# Patient Record
Sex: FEMALE | URBAN - METROPOLITAN AREA
[De-identification: names, ages, dates, MRNs, and addresses within clinical notes are randomized per-mention and may not be internally consistent; named-entity substitution may affect disease eponyms.]

---

## 2020-02-19 ENCOUNTER — TELEPHONE (OUTPATIENT)
Dept: NEUROLOGY | Facility: CLINIC | Age: 50
End: 2020-02-19

## 2020-02-27 ENCOUNTER — TELEPHONE (OUTPATIENT)
Dept: NEUROLOGY | Facility: CLINIC | Age: 50
End: 2020-02-27

## 2021-10-07 PROBLEM — Z00.00 ENCOUNTER FOR PREVENTIVE HEALTH EXAMINATION: Status: ACTIVE | Noted: 2021-10-07

## 2021-10-08 ENCOUNTER — APPOINTMENT (OUTPATIENT)
Dept: ORTHOPEDIC SURGERY | Facility: CLINIC | Age: 51
End: 2021-10-08
Payer: COMMERCIAL

## 2021-10-08 PROCEDURE — 99213 OFFICE O/P EST LOW 20 MIN: CPT | Mod: 95

## 2021-10-12 NOTE — HISTORY OF PRESENT ILLNESS
[de-identified] : HISTORY/CC: Patient is here in follow-up. Patient is approximately 10 months  status post revision thoracolumbar spinal fusion and 11 months s/p revision acdf C6-7.  \par Complains of chronic bilateral foot tingling.  Seeing neuro and has EMG planned.  She has no significant low back pain.  Also c/o R middle finger numbness.  Had a CT done of cervical spine. \par \par  \par \par \par \par \par  \par \par \par  \par \par \par  \par \par \par \par

## 2021-10-12 NOTE — ASSESSMENT
[FreeTextEntry1] : Reviewed CT C spine revealing solid fusion.  \par She will f/u with neurology for peripheral nerve complaints.

## 2021-12-13 ENCOUNTER — NON-APPOINTMENT (OUTPATIENT)
Age: 51
End: 2021-12-13

## 2023-02-17 ENCOUNTER — APPOINTMENT (OUTPATIENT)
Dept: ORTHOPEDIC SURGERY | Facility: CLINIC | Age: 53
End: 2023-02-17
Payer: COMMERCIAL

## 2023-02-17 DIAGNOSIS — M25.552 PAIN IN LEFT HIP: ICD-10-CM

## 2023-02-17 DIAGNOSIS — Z98.1 ARTHRODESIS STATUS: ICD-10-CM

## 2023-02-17 DIAGNOSIS — M51.24 OTHER INTERVERTEBRAL DISC DISPLACEMENT, THORACIC REGION: ICD-10-CM

## 2023-02-17 PROCEDURE — 99214 OFFICE O/P EST MOD 30 MIN: CPT | Mod: 95

## 2023-02-21 NOTE — REASON FOR VISIT
[Home] : at home, [unfilled] , at the time of the visit. [Other Location: e.g. Home (Enter Location, City,State)___] : at [unfilled] [Patient] : the patient [Self] : self [Follow-Up Visit] : a follow-up visit for [Neck Pain] : neck pain [Other: ____] : [unfilled]

## 2023-02-22 ENCOUNTER — TRANSCRIPTION ENCOUNTER (OUTPATIENT)
Age: 53
End: 2023-02-22

## 2023-03-08 PROBLEM — M51.24 HNP (HERNIATED NUCLEUS PULPOSUS), THORACIC: Status: ACTIVE | Noted: 2023-03-08

## 2023-03-08 PROBLEM — Z98.1 S/P SPINAL FUSION: Status: ACTIVE | Noted: 2021-10-12

## 2023-03-21 NOTE — DISCUSSION/SUMMARY
[de-identified] : A lengthy discussion was had with the patient regarding her condition.  Rx MRI L hip- can review via telehealth.  Neuro consult.   \par The patient's questions were sought and answered satisfactorily.\par \par \par \par MERLYN, Arlyn Kitchen NP am acting as a scribe for Dr. Frank Schwab.\par \par

## 2023-03-21 NOTE — HISTORY OF PRESENT ILLNESS
[de-identified] : Patient is approximately 3 years status post spinal fusion.  She complains of right mid and mid back pain that radiates into the ribs.  It is sharp and muscular.  She also has left groin pain with lifting her leg and left leg numbness for a year with the left leg being weaker.  States it is painful to walk, she has to grab onto things.  She has done physical therapy she exercises and has tried pain medication. Had steroid inj T10-T11 tfesi on right- no relief.

## 2023-03-21 NOTE — PHYSICAL EXAM
[de-identified] : CT myelogram lumbar, MRI thoracic, lumbar spine and L-spine x-rays dated 8/30/2022 reviewed by Dr. Schwab.  HNP T10-11 with spinal stenosis and kyphosis

## 2023-05-16 ENCOUNTER — APPOINTMENT (OUTPATIENT)
Dept: ORTHOPEDIC SURGERY | Facility: HOSPITAL | Age: 53
End: 2023-05-16
Payer: COMMERCIAL

## 2023-05-16 DIAGNOSIS — M70.62 TROCHANTERIC BURSITIS, LEFT HIP: ICD-10-CM

## 2023-05-16 PROCEDURE — 99213 OFFICE O/P EST LOW 20 MIN: CPT | Mod: 95

## 2023-05-25 PROBLEM — M70.62 GREATER TROCHANTERIC BURSITIS OF LEFT HIP: Status: ACTIVE | Noted: 2023-05-25

## 2023-06-15 NOTE — REASON FOR VISIT
[Home] : at home, [unfilled] , at the time of the visit. [Patient] : the patient [Follow-Up Visit] : a follow-up visit for [Medical Office: ([facility])___] : at the medical office located in

## 2023-06-27 NOTE — DISCUSSION/SUMMARY
[de-identified] : A lengthy discussion was held with the patient regarding her condition.  We discussed nonoperative treatment strategies including physical therapy, pharmacologic management and steroid injections.  We discussed surgical options (revision extension thoracic spinal fusion)  and the attendant benefits, alternatives, and risks, including but not limited to infection, bleeding, persistent pain, persistent neurologic deficit, neurologic injury, adjacent segment degeneration, and the need for future surgery.  The patient's questions were sought and answered satisfactorily.\par \par \par Arlyn ZULETA NP am acting as a scribe for Dr. Frank Schwab.\par \par

## 2023-06-27 NOTE — HISTORY OF PRESENT ILLNESS
[de-identified] : Pt presents to review MRI L hip report and neuro eval.  Complains of R rib/flank pain with twisting while driving.  Has mid back pain at top of incision.   L thigh is numb.  neuro said EMG not helpful.  Been in PT, acupuncture.  Facet inj T10-T11 denied by ins.  Taking morphine sulfate and norco prn. \par \par 2/17/23 telehealth: Patient is approximately 3 years status post spinal fusion. She complains of right mid and mid back pain that radiates into the ribs. It is sharp and muscular. She also has left groin pain with lifting her leg and left leg numbness for a year with the left leg being weaker. States it is painful to walk, she has to grab onto things. She has done physical therapy she exercises and has tried pain medication. Had steroid inj T10-T11 tfesi on right- no relief. \par  \par

## 2023-06-27 NOTE — PHYSICAL EXAM
[de-identified] : CT myelogram lumbar, MRI thoracic, lumbar spine and L-spine x-rays dated 8/30/2022 reviewed by Dr. Schwab. HNP T10-11 with spinal stenosis and kyphosis, advanced junctional DDD.\par \par  \par

## 2023-11-20 ENCOUNTER — APPOINTMENT (OUTPATIENT)
Dept: ORTHOPEDIC SURGERY | Facility: HOSPITAL | Age: 53
End: 2023-11-20

## 2023-11-20 ENCOUNTER — APPOINTMENT (OUTPATIENT)
Dept: ORTHOPEDIC SURGERY | Facility: CLINIC | Age: 53
End: 2023-11-20
Payer: COMMERCIAL

## 2023-11-20 PROCEDURE — 99202 OFFICE O/P NEW SF 15 MIN: CPT | Mod: 95

## 2023-11-20 PROCEDURE — 99212 OFFICE O/P EST SF 10 MIN: CPT | Mod: 95

## 2024-01-23 NOTE — PHYSICAL EXAM
[de-identified] : tele [de-identified] : MRI thoracic spine 11/7/2023 CD (orthopacs):  18 degrees PJK, advanced DDD/DJD with moderate spinal cord compression T10-T11

## 2024-01-23 NOTE — HISTORY OF PRESENT ILLNESS
[de-identified] : Pt presents with c/o back pain via telehealth.  She had revision  thoracolumbar spinal fusion [id number] by Dr Schwab at [facility] several years ago.  On 10/31, she was picking up her grandchild and developed severe low back spasms.  States she was "screaming" for days.  She didn't drive x 9 days, rested at home.  Took pain meds.  Had MRI T spine.  The pain has improved a little.  Denies radicular pain or leg weakness.  She needs to continue caring for her grandchildren.

## 2024-01-23 NOTE — DISCUSSION/SUMMARY
[Surgical risks reviewed] : Surgical risks reviewed [de-identified] : A lengthy discussion was held with the patient regarding her condition.  We discussed nonoperative treatment strategies including physical therapy, pharmacologic management and steroid injections.  We discussed surgical options (revision extension thoracic spinal fusion) and the attendant benefits, alternatives, and risks, including but not limited to infection, bleeding, persistent pain, persistent neurologic deficit, neurologic injury, adjacent segment degeneration, and the need for future surgery.  The patient's questions were sought and answered satisfactorily. She would like to wait until her grandchild is a little older before having spinal surgery.  She will f/u with office.  IArlyn NP am acting as a scribe for Dr. Frank Schwab.

## 2025-01-22 ENCOUNTER — APPOINTMENT (OUTPATIENT)
Dept: ORTHOPEDIC SURGERY | Age: 55
End: 2025-01-22
Payer: COMMERCIAL

## 2025-01-22 DIAGNOSIS — M25.551 PAIN IN RIGHT HIP: ICD-10-CM

## 2025-01-22 DIAGNOSIS — Z98.1 ARTHRODESIS STATUS: ICD-10-CM

## 2025-01-22 PROCEDURE — 99213 OFFICE O/P EST LOW 20 MIN: CPT | Mod: 95

## 2025-06-11 ENCOUNTER — APPOINTMENT (OUTPATIENT)
Dept: ORTHOPEDIC SURGERY | Age: 55
End: 2025-06-11
Payer: COMMERCIAL

## 2025-06-11 PROCEDURE — 99213 OFFICE O/P EST LOW 20 MIN: CPT | Mod: 95

## 2025-08-01 ENCOUNTER — APPOINTMENT (OUTPATIENT)
Dept: ORTHOPEDIC SURGERY | Facility: CLINIC | Age: 55
End: 2025-08-01
Payer: COMMERCIAL

## 2025-08-01 ENCOUNTER — NON-APPOINTMENT (OUTPATIENT)
Age: 55
End: 2025-08-01

## 2025-08-01 DIAGNOSIS — Z98.1 ARTHRODESIS STATUS: ICD-10-CM

## 2025-08-01 PROCEDURE — 99214 OFFICE O/P EST MOD 30 MIN: CPT | Mod: 95

## 2025-09-02 ENCOUNTER — NON-APPOINTMENT (OUTPATIENT)
Age: 55
End: 2025-09-02

## 2025-09-05 ENCOUNTER — NON-APPOINTMENT (OUTPATIENT)
Age: 55
End: 2025-09-05

## 2025-09-09 ENCOUNTER — APPOINTMENT (OUTPATIENT)
Dept: ORTHOPEDIC SURGERY | Facility: HOSPITAL | Age: 55
End: 2025-09-09

## 2025-09-09 ENCOUNTER — TRANSCRIPTION ENCOUNTER (OUTPATIENT)
Age: 55
End: 2025-09-09

## 2025-09-10 ENCOUNTER — TRANSCRIPTION ENCOUNTER (OUTPATIENT)
Age: 55
End: 2025-09-10

## 2025-09-13 ENCOUNTER — TRANSCRIPTION ENCOUNTER (OUTPATIENT)
Age: 55
End: 2025-09-13

## 2025-09-15 RX ORDER — OXYCODONE AND ACETAMINOPHEN 10; 325 MG/1; MG/1
10-325 TABLET ORAL
Qty: 42 | Refills: 0 | Status: ACTIVE | COMMUNITY
Start: 2025-09-15 | End: 1900-01-01

## 2025-09-19 ENCOUNTER — NON-APPOINTMENT (OUTPATIENT)
Age: 55
End: 2025-09-19